# Patient Record
Sex: MALE | Race: WHITE | NOT HISPANIC OR LATINO | URBAN - METROPOLITAN AREA
[De-identification: names, ages, dates, MRNs, and addresses within clinical notes are randomized per-mention and may not be internally consistent; named-entity substitution may affect disease eponyms.]

---

## 2019-11-04 ENCOUNTER — OFFICE (OUTPATIENT)
Dept: URBAN - METROPOLITAN AREA CLINIC 11 | Facility: CLINIC | Age: 59
End: 2019-11-04

## 2019-11-04 VITALS
WEIGHT: 215 LBS | HEART RATE: 63 BPM | HEIGHT: 73 IN | DIASTOLIC BLOOD PRESSURE: 74 MMHG | SYSTOLIC BLOOD PRESSURE: 117 MMHG

## 2019-11-04 DIAGNOSIS — K62.5 HEMORRHAGE OF ANUS AND RECTUM: ICD-10-CM

## 2019-11-04 DIAGNOSIS — Z86.010 PERSONAL HISTORY OF COLONIC POLYPS: ICD-10-CM

## 2019-11-04 PROCEDURE — 99203 OFFICE O/P NEW LOW 30 MIN: CPT | Performed by: INTERNAL MEDICINE

## 2019-11-06 ENCOUNTER — AMBULATORY SURGICAL CENTER (OUTPATIENT)
Dept: URBAN - METROPOLITAN AREA SURGERY 3 | Facility: SURGERY | Age: 59
End: 2019-11-06

## 2019-11-06 VITALS
RESPIRATION RATE: 19 BRPM | WEIGHT: 210 LBS | SYSTOLIC BLOOD PRESSURE: 115 MMHG | SYSTOLIC BLOOD PRESSURE: 115 MMHG | HEART RATE: 74 BPM | HEART RATE: 74 BPM | DIASTOLIC BLOOD PRESSURE: 75 MMHG | TEMPERATURE: 98.6 F | DIASTOLIC BLOOD PRESSURE: 75 MMHG | HEIGHT: 73 IN | RESPIRATION RATE: 18 BRPM | OXYGEN SATURATION: 100 % | SYSTOLIC BLOOD PRESSURE: 121 MMHG | DIASTOLIC BLOOD PRESSURE: 73 MMHG | OXYGEN SATURATION: 99 % | DIASTOLIC BLOOD PRESSURE: 69 MMHG | HEART RATE: 75 BPM | TEMPERATURE: 97.7 F | DIASTOLIC BLOOD PRESSURE: 78 MMHG | RESPIRATION RATE: 17 BRPM | SYSTOLIC BLOOD PRESSURE: 122 MMHG | RESPIRATION RATE: 20 BRPM | TEMPERATURE: 98.6 F | DIASTOLIC BLOOD PRESSURE: 75 MMHG | DIASTOLIC BLOOD PRESSURE: 63 MMHG | HEART RATE: 70 BPM | RESPIRATION RATE: 20 BRPM | TEMPERATURE: 97.7 F | SYSTOLIC BLOOD PRESSURE: 116 MMHG | OXYGEN SATURATION: 99 % | HEART RATE: 73 BPM | RESPIRATION RATE: 17 BRPM | SYSTOLIC BLOOD PRESSURE: 132 MMHG | DIASTOLIC BLOOD PRESSURE: 69 MMHG | RESPIRATION RATE: 20 BRPM | HEART RATE: 70 BPM | DIASTOLIC BLOOD PRESSURE: 63 MMHG | DIASTOLIC BLOOD PRESSURE: 63 MMHG | HEART RATE: 75 BPM | HEART RATE: 75 BPM | HEART RATE: 74 BPM | SYSTOLIC BLOOD PRESSURE: 122 MMHG | SYSTOLIC BLOOD PRESSURE: 122 MMHG | SYSTOLIC BLOOD PRESSURE: 115 MMHG | TEMPERATURE: 98.6 F | DIASTOLIC BLOOD PRESSURE: 73 MMHG | TEMPERATURE: 97.7 F | WEIGHT: 210 LBS | SYSTOLIC BLOOD PRESSURE: 116 MMHG | RESPIRATION RATE: 18 BRPM | DIASTOLIC BLOOD PRESSURE: 69 MMHG | OXYGEN SATURATION: 100 % | HEART RATE: 70 BPM | OXYGEN SATURATION: 100 % | OXYGEN SATURATION: 99 % | SYSTOLIC BLOOD PRESSURE: 132 MMHG | SYSTOLIC BLOOD PRESSURE: 116 MMHG | SYSTOLIC BLOOD PRESSURE: 121 MMHG | HEART RATE: 73 BPM | DIASTOLIC BLOOD PRESSURE: 78 MMHG | HEIGHT: 73 IN | SYSTOLIC BLOOD PRESSURE: 132 MMHG | HEIGHT: 73 IN | HEART RATE: 73 BPM | DIASTOLIC BLOOD PRESSURE: 78 MMHG | RESPIRATION RATE: 19 BRPM | SYSTOLIC BLOOD PRESSURE: 121 MMHG | DIASTOLIC BLOOD PRESSURE: 73 MMHG | RESPIRATION RATE: 17 BRPM | WEIGHT: 210 LBS | RESPIRATION RATE: 18 BRPM | RESPIRATION RATE: 19 BRPM

## 2019-11-06 DIAGNOSIS — Z86.010 PERSONAL HISTORY OF COLONIC POLYPS: ICD-10-CM

## 2019-11-06 DIAGNOSIS — K57.30 DIVERTICULOSIS OF LARGE INTESTINE WITHOUT PERFORATION OR ABS: ICD-10-CM

## 2019-11-06 DIAGNOSIS — K62.5 HEMORRHAGE OF ANUS AND RECTUM: ICD-10-CM

## 2019-11-06 DIAGNOSIS — K64.0 FIRST DEGREE HEMORRHOIDS: ICD-10-CM

## 2019-11-06 PROBLEM — K92.2 EVALUATION OF UNEXPLAINED GI BLEEDING: Status: ACTIVE | Noted: 2019-11-06

## 2019-11-06 PROCEDURE — 45378 DIAGNOSTIC COLONOSCOPY: CPT | Performed by: INTERNAL MEDICINE

## 2019-11-06 NOTE — SERVICEHPINOTES
59-year-old white male with history of colon polyps on his last study 6 years ago. He presents with history of rectal bleeding off and on for the last 2-3 years with bright red blood on the toilet tissue and in the bowl at times. He exercises and lifts weights fairly heavily at times and the rectal bleeding usually follows weight lifting. He has no chest pain or shortness of breath and no cardiac history. His bowel movements are unchanged and normal. He has no abdominal pain or rectal pain. There is no family history of colon cancer or polyps. His weight remains stable.

## 2019-11-06 NOTE — SERVICEHPINOTES
59-year-old white male with history of colon polyps on his last study 6 years ago. He presents with history of rectal bleeding off and on for the last 2-3 years with bright red blood on the toilet tissue and in the bowl at times. He exercises and lifts weights fairly heavily at times and the rectal bleeding usually follows weight lifting. He has no chest pain or shortness of breath and no cardiac history. His bowel movements are unchanged and normal. He has no abdominal pain or rectal pain. There is no family history of colon cancer or polyps. His weight remains stable.
Detail Level: Zone